# Patient Record
Sex: FEMALE
[De-identification: names, ages, dates, MRNs, and addresses within clinical notes are randomized per-mention and may not be internally consistent; named-entity substitution may affect disease eponyms.]

---

## 2022-08-26 PROBLEM — Z00.00 ENCOUNTER FOR PREVENTIVE HEALTH EXAMINATION: Status: ACTIVE | Noted: 2022-08-26

## 2022-08-30 ENCOUNTER — APPOINTMENT (OUTPATIENT)
Dept: ORTHOPEDIC SURGERY | Facility: CLINIC | Age: 77
End: 2022-08-30

## 2022-08-30 DIAGNOSIS — M19.049 PRIMARY OSTEOARTHRITIS, UNSPECIFIED HAND: ICD-10-CM

## 2022-08-30 DIAGNOSIS — I10 ESSENTIAL (PRIMARY) HYPERTENSION: ICD-10-CM

## 2022-08-30 PROCEDURE — 99204 OFFICE O/P NEW MOD 45 MIN: CPT

## 2022-08-30 PROCEDURE — 73130 X-RAY EXAM OF HAND: CPT | Mod: 50

## 2022-08-30 RX ORDER — MELOXICAM 15 MG/1
15 TABLET ORAL
Qty: 30 | Refills: 2 | Status: ACTIVE | COMMUNITY
Start: 2022-08-30 | End: 1900-01-01

## 2022-08-30 NOTE — HISTORY OF PRESENT ILLNESS
[Gradual] : gradual [9] : 9 [Radiating] : radiating [Sharp] : sharp [Shooting] : shooting [Stabbing] : stabbing [Constant] : constant [Leisure] : leisure [Retired] : Work status: retired [de-identified] : 8/30/22: 77yo LHD (retired) presents for BILATERAL hands. c/o constant pain localized to MP joints of all fingers > wrist, worse on waking. pain wakes her from sleep. Denies injury, but reports that she had Covid 8 weeks ago and that is when the pain started. Also reports remote injury to LEFT small finger - closed in a door.\par Denies numbness/tingling.\par Saw PCP => ordered PT - she attended 2x/wk for 13 sessions.\par Not taking any pain medication for this.\par \par Hx: HTN. [] : no [FreeTextEntry1] : both hands [FreeTextEntry5] : pt states pain in both hands began about 8 weeks ago and radiates to both shoulders, and pain is worse in the morning.  [FreeTextEntry7] : shoulder [FreeTextEntry9] : hot water and bio freeze [de-identified] : 08/29/2022 [de-identified] : PT [de-identified] : 08/29/2022

## 2022-08-30 NOTE — IMAGING
[de-identified] : LEFT HAND\par skin intact. no swelling.\par TTP to IF/MF MPJ, RF PIPJ. mild TTP to radial wrist, thumb CMCJ.\par good wrist extension, flexion. good pronation, supination.\par good EPL, FPL. SF DIPJ flex contracture, otherwise good finger extension, mild limited IF/MF flexion, other fingers flex to full fist. good finger abduction and adduction. \par SILT to median, ulnar, radial distribution. \par palpable radial pulse, brisk cap refill all digits.\par no triggering.\par negative Tinel's at carpal tunnel.\par \par \par RIGHT HAND\par skin intact. no swelling.\par TTP to MF/RF/SF PIPJ. mild TTP to radial wrist, thumb CMCJ.\par good wrist extension, flexion. good pronation, supination.\par good EPL, FPL. good finger extension, flex to full fist. good finger abduction and adduction. \par SILT to median, ulnar, radial distribution. \par palpable radial pulse, brisk cap refill all digits.\par no triggering.\par negative Tinel's at carpal tunnel. 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 djd of thumb CMCJ, IPJ. no acute displaced fracture or dislocation.

## 2022-08-30 NOTE — ASSESSMENT
[FreeTextEntry1] : The condition was explained to the patient. \par Discussed that arthritis is a progressive degenerative process, and symptoms may have a waxing/waning course, which may be exacerbated by activity or trauma. Discussed treatment options - activity modification, bracing, steroid injection, or last resort surgery.\par - recommend compression glove PRN.\par - recommend activity modification, moist heat PRN.\par - prescribed Meloxicam. discussed contra-indicated medical conditions (eg liver disease, kidney disease, or GI ulcer/bleeding) or medications (eg blood thinners). Discussed possible GI and blood pressure side effects.\par \par F/u PRN.

## 2024-05-30 ENCOUNTER — APPOINTMENT (OUTPATIENT)
Dept: OTOLARYNGOLOGY | Facility: CLINIC | Age: 79
End: 2024-05-30

## 2025-07-04 ENCOUNTER — EMERGENCY (EMERGENCY)
Facility: HOSPITAL | Age: 80
LOS: 1 days | End: 2025-07-04
Attending: STUDENT IN AN ORGANIZED HEALTH CARE EDUCATION/TRAINING PROGRAM
Payer: MEDICARE

## 2025-07-04 VITALS
HEIGHT: 65 IN | HEART RATE: 80 BPM | SYSTOLIC BLOOD PRESSURE: 103 MMHG | RESPIRATION RATE: 16 BRPM | TEMPERATURE: 98 F | DIASTOLIC BLOOD PRESSURE: 54 MMHG | OXYGEN SATURATION: 96 % | WEIGHT: 154.98 LBS

## 2025-07-04 PROCEDURE — 73501 X-RAY EXAM HIP UNI 1 VIEW: CPT | Mod: 26,RT

## 2025-07-04 PROCEDURE — 73552 X-RAY EXAM OF FEMUR 2/>: CPT

## 2025-07-04 PROCEDURE — 93005 ELECTROCARDIOGRAM TRACING: CPT

## 2025-07-04 PROCEDURE — 72125 CT NECK SPINE W/O DYE: CPT

## 2025-07-04 PROCEDURE — 73501 X-RAY EXAM HIP UNI 1 VIEW: CPT

## 2025-07-04 PROCEDURE — 71045 X-RAY EXAM CHEST 1 VIEW: CPT

## 2025-07-04 PROCEDURE — 70450 CT HEAD/BRAIN W/O DYE: CPT

## 2025-07-04 PROCEDURE — 73590 X-RAY EXAM OF LOWER LEG: CPT

## 2025-07-04 PROCEDURE — 99285 EMERGENCY DEPT VISIT HI MDM: CPT | Mod: 25

## 2025-07-04 PROCEDURE — 99285 EMERGENCY DEPT VISIT HI MDM: CPT

## 2025-07-04 PROCEDURE — 73110 X-RAY EXAM OF WRIST: CPT | Mod: 26,RT

## 2025-07-04 PROCEDURE — 73590 X-RAY EXAM OF LOWER LEG: CPT | Mod: 26,RT

## 2025-07-04 PROCEDURE — 25600 CLTX DST RDL FX/EPHYS SEP WO: CPT | Mod: RT

## 2025-07-04 PROCEDURE — 93010 ELECTROCARDIOGRAM REPORT: CPT

## 2025-07-04 PROCEDURE — 73552 X-RAY EXAM OF FEMUR 2/>: CPT | Mod: 26,RT

## 2025-07-04 PROCEDURE — 73110 X-RAY EXAM OF WRIST: CPT

## 2025-07-04 PROCEDURE — 70450 CT HEAD/BRAIN W/O DYE: CPT | Mod: 26

## 2025-07-04 PROCEDURE — 72125 CT NECK SPINE W/O DYE: CPT | Mod: 26

## 2025-07-04 PROCEDURE — 71045 X-RAY EXAM CHEST 1 VIEW: CPT | Mod: 26

## 2025-07-04 RX ORDER — OXYCODONE HYDROCHLORIDE AND ACETAMINOPHEN 10; 325 MG/1; MG/1
1 TABLET ORAL
Qty: 20 | Refills: 0
Start: 2025-07-04 | End: 2025-07-08

## 2025-07-04 RX ORDER — ONDANSETRON HCL/PF 4 MG/2 ML
4 VIAL (ML) INJECTION ONCE
Refills: 0 | Status: COMPLETED | OUTPATIENT
Start: 2025-07-04 | End: 2025-07-04

## 2025-07-04 RX ORDER — IBUPROFEN 200 MG
1 TABLET ORAL
Qty: 42 | Refills: 0
Start: 2025-07-04 | End: 2025-07-17

## 2025-07-04 NOTE — ED PROVIDER NOTE - CLINICAL SUMMARY MEDICAL DECISION MAKING FREE TEXT BOX
Workup: XR   Findings: Fracture of distal radius.     Patient does not currently demonstrate complications of fracture such as compartment syndrome, arterial or nerve injury.  Interventions:    Immobilization: The fracture has been satisfactorily immobilized, and the patient has been given appropriate analgesia.    Disposition: Patient will be discharged with strict return precautions and follow up with primary MD within 24-48 hours for further evaluation including referral to an orthopedist for follow up within the next 4-7 days for outpatient definitive fracture management.

## 2025-07-04 NOTE — ED PROVIDER NOTE - PATIENT PORTAL LINK FT
You can access the FollowMyHealth Patient Portal offered by [facility] by registering at the following website: [email or web address]. By joining [facility]’s FollowMyHealth portal, you will also be able to view your health information using other applications (apps) compatible with our system.

## 2025-07-04 NOTE — ED ADULT TRIAGE NOTE - CHIEF COMPLAINT QUOTE
R wrist pain s/p trip and fall in the street last night ,reports hitting head ,on aspirin 81 mg ,denied LOC

## 2025-07-04 NOTE — ED PROVIDER NOTE - OBJECTIVE STATEMENT
79 female sent from assisted living facility for fall.  Patient complaining of right wrist pain.  Reports no head trauma. 79 female arrives from home after fall.  Patient complaining of right wrist pain.  Reports no head trauma.

## 2025-07-04 NOTE — ED ADULT NURSE NOTE - NSFALLRISKINTERV_ED_ALL_ED

## 2025-07-04 NOTE — ED ADULT NURSE NOTE - OBJECTIVE STATEMENT
Pt AOx4, ambulatory, c/o right wrist pain s/p mechanical fall yesterday. Pt reports she hit her head but did not LOC. PT takes ASA 81mg daily. No active bleeding. No neuro deficit noted. Fall risk protocol initiated due to recent fall. Family and patient educated regarding fall risk who verbalized understanding.

## 2025-07-04 NOTE — ED PROVIDER NOTE - NSFOLLOWUPCLINICS_GEN_ALL_ED_FT
[facility]  Orthopedics  [address] [place] [address]  Phone: [phone]  Fax: [phone]  Follow Up Time: 4-6 Days

## 2025-07-04 NOTE — ED PROVIDER NOTE - NSFOLLOWUPINSTRUCTIONS_ED_ALL_ED_FT
Distal radius fractures are one of the most common types of bone fractures. They occur at the end of the radius bone near the wrist.    Depending on the angle of the break, distal radius fractures can be classified into two types: Colles or Knight.    Falls are the main cause of distal radius fractures. They may also occur during trauma from a vehicle accident or sports injury.    Treatment varies but may include a sling or cast and sometimes surgery in the case of an unstable or displaced fracture.    The radius is one of two forearm bones and is located on the thumb side. The part of the radius connected to the wrist joint is called the distal radius. When the radius breaks near the wrist, it is called a distal radius fracture.    The break usually happens due to falling on an outstretched or flexed hand. It can also happen in a car accident, a bike accident, a skiing accident or another sports activity.    A distal radius fracture can be isolated, which means no other fractures are involved. It can also occur along with a fracture of the distal ulna (the forearm bone on the small finger side).

## 2025-07-04 NOTE — ED PROVIDER NOTE - PHYSICAL EXAMINATION
GENERAL APPEARANCE:  AAOx3, generally well-appearing, no acute distress. Appears stated age.  HEENT:  NCAT. Moist mucous membranes. EOMI, clear conjunctiva, no slceral icterus, oropharynx clear.  NECK:  Supple without lymphadenopathy. No JVD.  No neck stiffness or restricted ROM.  HEART:  Normal heart rate and regular rhythm. No murmur.   LUNGS:  CTAB, moving air well. No crackles or wheezes are heard.  ABDOMEN:  Soft, nontender, non-distended. Negative Marlow. Negative McBurney. No rebound or guarding.  CHEST/BACK: Chest wall non-tender. No CVAT, or midline cervical/thoracic/lumbar tenderness to palpation. No obvious deformity of chest wall or back.  EXTREMITIES:  Without cyanosis, clubbing or edema. Pulse intact x 4. FROM x4. Compartments soft in all extremities.  NEUROLOGICAL:  Grossly non-focal. Alert and oriented, moving all 4 extremities. CN II-XII grossly intact. Observed to ambulate with normal gait.  SKIN:  Warm and dry without any rash.  PSYCH: Calm, cooperative. Normal mood and affect. Demonstrates proper insight and judgement.

## 2025-07-09 ENCOUNTER — APPOINTMENT (OUTPATIENT)
Dept: PLASTIC SURGERY | Facility: CLINIC | Age: 80
End: 2025-07-09

## 2025-07-09 PROCEDURE — 29125 APPL SHORT ARM SPLINT STATIC: CPT | Mod: RT

## 2025-07-23 ENCOUNTER — APPOINTMENT (OUTPATIENT)
Dept: PLASTIC SURGERY | Facility: CLINIC | Age: 80
End: 2025-07-23
Payer: COMMERCIAL

## 2025-07-23 PROCEDURE — 29125 APPL SHORT ARM SPLINT STATIC: CPT | Mod: RT

## 2025-07-23 PROCEDURE — 99024 POSTOP FOLLOW-UP VISIT: CPT

## 2025-07-23 PROCEDURE — 73110 X-RAY EXAM OF WRIST: CPT

## 2025-08-06 ENCOUNTER — APPOINTMENT (OUTPATIENT)
Dept: PLASTIC SURGERY | Facility: CLINIC | Age: 80
End: 2025-08-06

## 2025-08-06 DIAGNOSIS — S52.561A BARTON'S FRACTURE OF RIGHT RADIUS, INITIAL ENCOUNTER FOR CLOSED FRACTURE: ICD-10-CM

## 2025-08-06 PROCEDURE — 99212 OFFICE O/P EST SF 10 MIN: CPT

## 2025-08-06 PROCEDURE — 73110 X-RAY EXAM OF WRIST: CPT

## 2025-09-05 ENCOUNTER — APPOINTMENT (OUTPATIENT)
Dept: PLASTIC SURGERY | Facility: CLINIC | Age: 80
End: 2025-09-05